# Patient Record
Sex: FEMALE
[De-identification: names, ages, dates, MRNs, and addresses within clinical notes are randomized per-mention and may not be internally consistent; named-entity substitution may affect disease eponyms.]

---

## 2018-04-29 ENCOUNTER — HOSPITAL ENCOUNTER (EMERGENCY)
Dept: HOSPITAL 31 - C.ER | Age: 58
Discharge: HOME | End: 2018-04-29
Payer: COMMERCIAL

## 2018-04-29 VITALS
SYSTOLIC BLOOD PRESSURE: 141 MMHG | TEMPERATURE: 98.5 F | DIASTOLIC BLOOD PRESSURE: 82 MMHG | HEART RATE: 87 BPM | RESPIRATION RATE: 16 BRPM

## 2018-04-29 VITALS — OXYGEN SATURATION: 99 %

## 2018-04-29 DIAGNOSIS — B02.9: Primary | ICD-10-CM

## 2018-04-29 NOTE — C.PDOC
History Of Present Illness


56yo female with history of chronic back pain for which she is taking tramadol 

and neurontin, presents to ED for evaluation of a vesicular rash on her left 

lower abdomen, present for the past 3 days. She denies any fever, chills, chest 

pain, shortness of breath and offers no other medical complaints. 


Time Seen by Provider: 04/29/18 19:39


Chief Complaint (Nursing): Abnormal Skin Integrity


History Per: Patient


History/Exam Limitations: no limitations


Onset/Duration Of Symptoms: Days (3)


Current Symptoms Are (Timing): Still Present


Location Of Injury: Left: Abdomen (lower)


Quality Of Symptoms: Painful


Additional History Per: Patient





Past Medical History


Reviewed: Historical Data, Nursing Documentation, Vital Signs


Vital Signs: 


 Last Vital Signs











Temp  98.5 F   04/29/18 19:59


 


Pulse  87   04/29/18 19:59


 


Resp  16   04/29/18 19:59


 


BP  141/82   04/29/18 19:59


 


Pulse Ox  97   04/29/18 19:59














- Medical History


PMH: Back Problems


Surgical History: No Surg Hx





- [facility] Procedures








TETANUS TOXOID ADMINIST (07/07/13)








Family History: States: No Known Family Hx





- Social History


Hx Alcohol Use: No


Hx Substance Use: No





- Immunization History


Hx Tetanus Toxoid Vaccination: No


Hx Influenza Vaccination: Yes


Hx Pneumococcal Vaccination: No





Review Of Systems


Except As Marked, All Systems Reviewed And Found Negative.


Constitutional: Negative for: Fever, Chills


Skin: Positive for: Rash (left lower abdomen)





Physical Exam





- Physical Exam


Appears: Non-toxic


Skin: Warm, Dry, Rash (vesicular rash to left lower abdomen dermatome with no 

midline crossing.)


Head: Atraumatic, Normacephalic


Eye(s): bilateral: PERRL


Neck: Normal ROM, Supple


Chest: Symmetrical


Cardiovascular: Rhythm Regular


Respiratory: Normal Breath Sounds, No Wheezing


Gastrointestinal/Abdominal: Soft, No Tenderness


Back: Normal Inspection


Extremity: Normal ROM, No Deformity


Neurological/Psych: Oriented x3





ED Course And Treatment


O2 Sat by Pulse Oximetry: 99 (RA)


Pulse Ox Interpretation: Normal





Medical Decision Making


Medical Decision Making: 





classic shingles L lower abd








Disposition


Doctor Will See Patient In The: Office


Counseled Patient/Family Regarding: Studies Performed, Diagnosis





- Disposition


Referrals: 


[facility] [Outside]


[facility] [Outside]


[facility] [Outside]


Disposition: HOME/ ROUTINE


Disposition Time: 19:47


Condition: GOOD


Additional Instructions: 


prednisone 40 mg daily until completed


Pepcid 20 mg @ night to prevent stomach irritation from the Prednisone





Lidoderm patches to the rash- may cut to size


Change as needed





Valacyclovir- Anti-viral medication


1000 mg three times a day for 7 days





Continue your Gabapentin 300 mg three times a day.





Follow-up in our outpatient Clinic as needed. 


Prescriptions: 


Lidocaine 5% [Lidoderm] 1 ea TD DAILY #10 patch


oxyCODONE/Acetaminophen [Percocet 5/325 mg Tab] 1 ea PO Q6H PRN #20 tab


 PRN Reason: Pain, Moderate (4-7)


Prednisone [Deltasone] 40 mg PO DAILY #8 tablet


Valacyclovir HCl [Valacyclovir] 1,000 mg PO Q8H #42 tablet


Instructions:  Shingles (DC)


Forms:  [facility] (English)





- Clinical Impression


Clinical Impression: 


 Shingles








- Scribe Statement


The provider has reviewed the documentation as recorded by the Scribe (Venita Hicks)


Provider Attestation: 


All medical record entries made by the Scribe were at my direction and 

personally dictated by me. I have reviewed the chart and agree that the record 

accurately reflects my personal performance of the history, physical exam, 

medical decision making, and the department course for this patient. I have 

also personally directed, reviewed, and agree with the discharge instructions 

and disposition.

## 2018-05-16 ENCOUNTER — HOSPITAL ENCOUNTER (EMERGENCY)
Dept: HOSPITAL 31 - C.ER | Age: 58
Discharge: HOME | End: 2018-05-16
Payer: SELF-PAY

## 2018-05-16 VITALS
SYSTOLIC BLOOD PRESSURE: 144 MMHG | RESPIRATION RATE: 20 BRPM | HEART RATE: 90 BPM | OXYGEN SATURATION: 99 % | TEMPERATURE: 98.2 F | DIASTOLIC BLOOD PRESSURE: 90 MMHG

## 2018-05-16 DIAGNOSIS — B02.29: Primary | ICD-10-CM

## 2018-05-16 NOTE — C.PDOC
History Of Present Illness


58 y/o female presents to the ER complaining of left- sided abdominal pain. 

Patient states that she was diagnosed with shingles in [place] ER 2 weeks ago 

and she was prescribed Prednisone and Valacyclovir. Patient reports that she 

has not finished the course of her medications.


Time Seen by Provider: 05/16/18 19:16


Chief Complaint (Nursing): Abdominal Pain


History Per: Patient


History/Exam Limitations: no limitations


Onset/Duration Of Symptoms: Days


Current Symptoms Are (Timing): Still Present


Severity: Moderate


Associated Symptoms: denies: Fever, Chills, Nausea, Vomiting, Diarrhea, Back 

Pain, Chest Pain


Exacerbating Factors: None


Alleviating Factors: None


Recent travel outside of the [place]: No





Past Medical History


Reviewed: Historical Data, Nursing Documentation, Vital Signs


Vital Signs: 


 Last Vital Signs











Temp  98.2 F   05/16/18 18:42


 


Pulse  90   05/16/18 18:42


 


Resp  20   05/16/18 18:42


 


BP  144/90   05/16/18 18:42


 


Pulse Ox  99   05/16/18 20:28














- Medical History


PMH: Back Problems


Surgical History: No Surg Hx





- [facility] Procedures








TETANUS TOXOID ADMINIST (07/07/13)








Family History: States: No Known Family Hx





- Social History


Hx Alcohol Use: No


Hx Substance Use: No





- Immunization History


Hx Tetanus Toxoid Vaccination: No


Hx Influenza Vaccination: Yes


Hx Pneumococcal Vaccination: No





Review Of Systems


Except As Marked, All Systems Reviewed And Found Negative.


Constitutional: Negative for: Fever, Chills


Gastrointestinal: Positive for: Abdominal Pain (left-sided abdominal pain)





Physical Exam





- Physical Exam


Appears: Non-toxic, No Acute Distress


Skin: Normal Color, Warm, Dry, Other (healing vesicles to left side of abdomen, 

no signs of cellulitis)


Head: Atraumatic, Normacephalic


Eye(s): bilateral: Normal Inspection, PERRL, EOMI


Nose: Normal


Oral Mucosa: Moist


Throat: No Erythema, No Exudate


Neck: Normal ROM, Supple


Chest: Symmetrical


Cardiovascular: Rhythm Regular


Respiratory: Normal Breath Sounds, No Rales, No Rhonchi, No Wheezing


Gastrointestinal/Abdominal: Bowel Sounds (active), Soft, No Tenderness


Back: Normal Inspection, No CVA Tenderness


Extremity: Normal ROM, No Tenderness, No Swelling


Neurological/Psych: Oriented x3, Normal Speech, Normal Motor, Normal Sensation


Gait: Steady





ED Course And Treatment


O2 Sat by Pulse Oximetry: 99 (RA)


Pulse Ox Interpretation: Normal





Medical Decision Making


Medical Decision Making: 


Plan:


--Lidoderm


--Tramadol PO





On re-exam, the patient remains playful and alert. Lungs are CTA, heart is RRR, 

abdomen is soft, non-tender and tolerating Po well. Patient was instructed to 

follow up with the medical doctor/clinic within 1-2 days.  Return to the ED if 

worsened. 








Disposition





- Disposition


Referrals: 


[facility] [Outside]


[facility] [employer] [employer] [facility] [Outside]


Disposition: HOME/ ROUTINE


Disposition Time: 20:06


Condition: FAIR


Additional Instructions: 


Follow up with the medical clinic within 1-2 days. Return if worsened. 


Prescriptions: 


Gabapentin 300 mg PO TID #15 capsule


traMADol [Ultram] 50 mg PO Q6 PRN #15 tab


 PRN Reason: Pain


Instructions:  Shingles (DC)


Forms:  [facility] (English)





- Clinical Impression


Clinical Impression: 


 Shingles, Post herpetic neuralgia








- PA / NP / Resident Statement


MD/DO has reviewed & agrees with the documentation as recorded.





- Scribe Statement


The provider has reviewed the documentation as recorded by the Jieibe


Evelyn Jacob





Provider Attestation





All medical record entries made by the Scribe were at my direction and 

personally dictated by me. I have reviewed the chart and agree that the record 

accurately reflects my personal performance of the history, physical exam, 

medical decision making, and the department course for this patient. I have 

also personally directed, reviewed, and agree with the discharge instructions 

and disposition.